# Patient Record
Sex: FEMALE | Race: OTHER | Employment: UNEMPLOYED | URBAN - METROPOLITAN AREA
[De-identification: names, ages, dates, MRNs, and addresses within clinical notes are randomized per-mention and may not be internally consistent; named-entity substitution may affect disease eponyms.]

---

## 2020-02-17 ENCOUNTER — HOSPITAL ENCOUNTER (EMERGENCY)
Age: 35
Discharge: HOME | End: 2020-02-17
Attending: STUDENT IN AN ORGANIZED HEALTH CARE EDUCATION/TRAINING PROGRAM
Payer: SELF-PAY

## 2020-02-17 ENCOUNTER — APPOINTMENT (OUTPATIENT)
Dept: ULTRASOUND IMAGING | Age: 35
End: 2020-02-17
Attending: STUDENT IN AN ORGANIZED HEALTH CARE EDUCATION/TRAINING PROGRAM
Payer: SELF-PAY

## 2020-02-17 VITALS
HEIGHT: 63 IN | RESPIRATION RATE: 14 BRPM | HEART RATE: 70 BPM | WEIGHT: 150 LBS | OXYGEN SATURATION: 99 % | TEMPERATURE: 98.4 F | SYSTOLIC BLOOD PRESSURE: 103 MMHG | DIASTOLIC BLOOD PRESSURE: 66 MMHG | BODY MASS INDEX: 26.58 KG/M2

## 2020-02-17 DIAGNOSIS — N73.0 PID (ACUTE PELVIC INFLAMMATORY DISEASE): Primary | ICD-10-CM

## 2020-02-17 LAB
ALBUMIN SERPL-MCNC: 3.8 G/DL (ref 3.5–5)
ALBUMIN/GLOB SERPL: 1 {RATIO} (ref 1.1–2.2)
ALP SERPL-CCNC: 47 U/L (ref 45–117)
ALT SERPL-CCNC: 19 U/L (ref 12–78)
ANION GAP SERPL CALC-SCNC: 7 MMOL/L (ref 5–15)
APPEARANCE UR: CLEAR
AST SERPL-CCNC: 12 U/L (ref 15–37)
BASOPHILS # BLD: 0 K/UL (ref 0–0.1)
BASOPHILS NFR BLD: 0 % (ref 0–1)
BILIRUB SERPL-MCNC: 0.3 MG/DL (ref 0.2–1)
BILIRUB UR QL: NEGATIVE
BUN SERPL-MCNC: 8 MG/DL (ref 6–20)
BUN/CREAT SERPL: 12 (ref 12–20)
CALCIUM SERPL-MCNC: 8.4 MG/DL (ref 8.5–10.1)
CHLORIDE SERPL-SCNC: 107 MMOL/L (ref 97–108)
CLUE CELLS VAG QL WET PREP: NEGATIVE
CO2 SERPL-SCNC: 28 MMOL/L (ref 21–32)
COLOR UR: NORMAL
CREAT SERPL-MCNC: 0.66 MG/DL (ref 0.55–1.02)
DIFFERENTIAL METHOD BLD: NORMAL
EOSINOPHIL # BLD: 0.1 K/UL (ref 0–0.4)
EOSINOPHIL NFR BLD: 1 % (ref 0–7)
ERYTHROCYTE [DISTWIDTH] IN BLOOD BY AUTOMATED COUNT: 12.6 % (ref 11.5–14.5)
GLOBULIN SER CALC-MCNC: 4 G/DL (ref 2–4)
GLUCOSE SERPL-MCNC: 102 MG/DL (ref 65–100)
GLUCOSE UR STRIP.AUTO-MCNC: NEGATIVE MG/DL
HCG UR QL: NEGATIVE
HCT VFR BLD AUTO: 41.5 % (ref 35–47)
HGB BLD-MCNC: 13.4 G/DL (ref 11.5–16)
HGB UR QL STRIP: NEGATIVE
KETONES UR QL STRIP.AUTO: NEGATIVE MG/DL
KOH PREP SPEC: NORMAL
LEUKOCYTE ESTERASE UR QL STRIP.AUTO: NEGATIVE
LIPASE SERPL-CCNC: 129 U/L (ref 73–393)
LYMPHOCYTES # BLD: 1.2 K/UL (ref 0.8–3.5)
LYMPHOCYTES NFR BLD: 25 % (ref 12–49)
MCH RBC QN AUTO: 30.7 PG (ref 26–34)
MCHC RBC AUTO-ENTMCNC: 32.3 G/DL (ref 30–36.5)
MCV RBC AUTO: 95.2 FL (ref 80–99)
MONOCYTES # BLD: 0.3 K/UL (ref 0–1)
MONOCYTES NFR BLD: 6 % (ref 5–13)
NEUTS SEG # BLD: 3.3 K/UL (ref 1.8–8)
NEUTS SEG NFR BLD: 68 % (ref 32–75)
NITRITE UR QL STRIP.AUTO: NEGATIVE
PH UR STRIP: 6.5 [PH] (ref 5–8)
PLATELET # BLD AUTO: 258 K/UL (ref 150–400)
PMV BLD AUTO: 10 FL (ref 8.9–12.9)
POTASSIUM SERPL-SCNC: 3.7 MMOL/L (ref 3.5–5.1)
PROT SERPL-MCNC: 7.8 G/DL (ref 6.4–8.2)
PROT UR STRIP-MCNC: NEGATIVE MG/DL
RBC # BLD AUTO: 4.36 M/UL (ref 3.8–5.2)
SERVICE CMNT-IMP: NORMAL
SODIUM SERPL-SCNC: 142 MMOL/L (ref 136–145)
SP GR UR REFRACTOMETRY: 1.01 (ref 1–1.03)
T VAGINALIS VAG QL WET PREP: NORMAL
UROBILINOGEN UR QL STRIP.AUTO: 0.2 EU/DL (ref 0.2–1)
WBC # BLD AUTO: 4.9 K/UL (ref 3.6–11)

## 2020-02-17 PROCEDURE — 76830 TRANSVAGINAL US NON-OB: CPT

## 2020-02-17 PROCEDURE — 74011250636 HC RX REV CODE- 250/636: Performed by: STUDENT IN AN ORGANIZED HEALTH CARE EDUCATION/TRAINING PROGRAM

## 2020-02-17 PROCEDURE — 99284 EMERGENCY DEPT VISIT MOD MDM: CPT

## 2020-02-17 PROCEDURE — 83690 ASSAY OF LIPASE: CPT

## 2020-02-17 PROCEDURE — 81003 URINALYSIS AUTO W/O SCOPE: CPT

## 2020-02-17 PROCEDURE — 81025 URINE PREGNANCY TEST: CPT

## 2020-02-17 PROCEDURE — 74011250637 HC RX REV CODE- 250/637: Performed by: STUDENT IN AN ORGANIZED HEALTH CARE EDUCATION/TRAINING PROGRAM

## 2020-02-17 PROCEDURE — 85025 COMPLETE CBC W/AUTO DIFF WBC: CPT

## 2020-02-17 PROCEDURE — 87491 CHLMYD TRACH DNA AMP PROBE: CPT

## 2020-02-17 PROCEDURE — 87210 SMEAR WET MOUNT SALINE/INK: CPT

## 2020-02-17 PROCEDURE — 36415 COLL VENOUS BLD VENIPUNCTURE: CPT

## 2020-02-17 PROCEDURE — 76856 US EXAM PELVIC COMPLETE: CPT

## 2020-02-17 PROCEDURE — 80053 COMPREHEN METABOLIC PANEL: CPT

## 2020-02-17 PROCEDURE — 76705 ECHO EXAM OF ABDOMEN: CPT

## 2020-02-17 PROCEDURE — 96372 THER/PROPH/DIAG INJ SC/IM: CPT

## 2020-02-17 PROCEDURE — 74011000250 HC RX REV CODE- 250: Performed by: STUDENT IN AN ORGANIZED HEALTH CARE EDUCATION/TRAINING PROGRAM

## 2020-02-17 RX ORDER — DOXYCYCLINE HYCLATE 100 MG
100 TABLET ORAL 2 TIMES DAILY
Qty: 28 TAB | Refills: 0 | Status: SHIPPED | OUTPATIENT
Start: 2020-02-17 | End: 2020-03-02

## 2020-02-17 RX ORDER — NAPROXEN 500 MG/1
500 TABLET ORAL 2 TIMES DAILY WITH MEALS
Qty: 20 TAB | Refills: 0 | Status: SHIPPED | OUTPATIENT
Start: 2020-02-17 | End: 2020-02-27

## 2020-02-17 RX ORDER — AZITHROMYCIN 250 MG/1
1000 TABLET, FILM COATED ORAL
Status: COMPLETED | OUTPATIENT
Start: 2020-02-17 | End: 2020-02-17

## 2020-02-17 RX ADMIN — AZITHROMYCIN MONOHYDRATE 1000 MG: 250 TABLET ORAL at 11:44

## 2020-02-17 RX ADMIN — LIDOCAINE HYDROCHLORIDE 40 ML: 20 SOLUTION ORAL; TOPICAL at 10:03

## 2020-02-17 RX ADMIN — LIDOCAINE HYDROCHLORIDE 250 MG: 10 INJECTION, SOLUTION EPIDURAL; INFILTRATION; INTRACAUDAL; PERINEURAL at 11:45

## 2020-02-17 NOTE — ED NOTES
The patient was discharged home by provider in stable condition. The patient is alert and oriented, in no respiratory distress and discharge vital signs obtained. The patient's diagnosis, condition and treatment were explained. The patient expressed understanding. Two prescriptions given. A discharge plan has been developed. A  was not involved in the process. Aftercare instructions were given. Patient ambulatory out of the ED with family.

## 2020-02-17 NOTE — DISCHARGE INSTRUCTIONS
PLEASE RETURN IF ABDOMINAL PAIN WORSENS, LOCALIZES TO THE RIGHT LOWER ABDOMEN, FEVER, OR IF YOU ARE NOT ABLE TO KEEP DOWN LIQUIDS. POR FAVOR TOME LOS MEDICAMENTOS SEGÚN  [facility].  [address] CON OB / Onalee Maik EN LOS PRÓXIMOS 2-3 DÍAS. SI NO ESTÁ MEJORANDO, PUEDEN NECESITAR QUITAR AMOS DIU. SIN SEXO POR 2 SEMANAS.

## 2020-02-17 NOTE — ED PROVIDER NOTES
The patient is a 68-year-old female presenting to the emergency department today secondary to abdominal pain. History obtained with the use of phone interpretation service. Patient reports 3 days of epigastric burning sensation [place] that radiates to her bilateral flanks. Pain is constant. Nothing makes it better or worse. She has had nausea but no vomiting or diarrhea. No urinary symptoms, vaginal bleeding or discharge. Last menstrual period was about 3 weeks ago, she has an IUD in place. She denies chest pain or shortness of breath. No fevers but has had cold sweats. Denies history of surgery to the abdomen. Denies any other complaints at this time. History reviewed. No pertinent past medical history. History reviewed. No pertinent surgical history. History reviewed. No pertinent family history.     Social History     Socioeconomic History    Marital status: Not on file     Spouse name: Not on file    Number of children: Not on file    Years of education: Not on file    Highest education level: Not on file   Occupational History    Not on file   Social Needs    Financial resource strain: Not on file    Food insecurity:     Worry: Not on file     Inability: Not on file    Transportation needs:     Medical: Not on file     Non-medical: Not on file   Tobacco Use    Smoking status: Never Smoker    Smokeless tobacco: Never Used   Substance and Sexual Activity    Alcohol use: Never     Frequency: Never    Drug use: Not on file    Sexual activity: Not on file   Lifestyle    Physical activity:     Days per week: Not on file     Minutes per session: Not on file    Stress: Not on file   Relationships    Social connections:     Talks on phone: Not on file     Gets together: Not on file     Attends Latter day service: Not on file     Active member of club or organization: Not on file     Attends meetings of clubs or organizations: Not on file     Relationship status: Not on file  Intimate partner violence:     Fear of current or ex partner: Not on file     Emotionally abused: Not on file     Physically abused: Not on file     Forced sexual activity: Not on file   Other Topics Concern    Not on file   Social History Narrative    Not on file         ALLERGIES: Patient has no known allergies. Review of Systems   Constitutional: Positive for chills. Negative for fever. HENT: Negative for congestion and rhinorrhea. Eyes: Negative for redness and visual disturbance. Respiratory: Negative for cough and shortness of breath. Cardiovascular: Negative for chest pain and leg swelling. Gastrointestinal: Positive for abdominal pain and nausea. Negative for diarrhea and vomiting. Genitourinary: Negative for dysuria, flank pain, frequency, hematuria and urgency. Musculoskeletal: Positive for back pain. Negative for arthralgias, myalgias and neck pain. Skin: Negative for rash and wound. Allergic/Immunologic: Negative for immunocompromised state. Neurological: Negative for dizziness and headaches. Vitals:    02/17/20 0919   BP: 103/66   Pulse: 70   Resp: 14   Temp: 98.4 °F (36.9 °C)   SpO2: 99%   Weight: 68 kg (150 lb)   Height: 5' 3\" (1.6 m)            Physical Exam  Vitals signs and nursing note reviewed. Constitutional:       General: She is not in acute distress. Appearance: She is well-developed. She is not diaphoretic. HENT:      Head: Normocephalic. Mouth/Throat:      Pharynx: No oropharyngeal exudate. Eyes:      General:         Right eye: No discharge. Left eye: No discharge. Pupils: Pupils are equal, round, and reactive to light. Neck:      Musculoskeletal: Normal range of motion and neck supple. Cardiovascular:      Rate and Rhythm: Normal rate and regular rhythm. Heart sounds: Normal heart sounds. No murmur. No friction rub. No gallop. Pulmonary:      Effort: Pulmonary effort is normal. No respiratory distress. Breath sounds: Normal breath sounds. No stridor. No wheezing or rales. Abdominal:      General: Bowel sounds are normal. There is no distension. Palpations: Abdomen is soft. Tenderness: There is abdominal tenderness in the right upper quadrant, epigastric area and left upper quadrant. There is no guarding or rebound. Genitourinary:     Comments: Pelvic exam performed with RN Jolynn Yap at bedside  External genitalia normal  +CMT but normal appearing  Thick white discharge  L adnexal tenderness but no fullness  Musculoskeletal: Normal range of motion. General: No deformity. Skin:     General: Skin is warm and dry. Capillary Refill: Capillary refill takes less than 2 seconds. Findings: No rash. Neurological:      Mental Status: She is alert and oriented to person, place, and time. Psychiatric:         Behavior: Behavior normal.        Labs Reviewed:   No leukocytosis or anemia  Normal renal function with electrolytes  Pregnancy negative  Lipase normal  UA not consistent with UTI        Imaging Reviewed:   Right upper quadrant ultrasound negative for gallbladder disease      Course: The patient was reevaluated once again with  phone service. She is now telling me that her pain was actually not in the epigastrium however more so in her \"ovaries\". She is also now tells me that she has brown/bloody pelvic discharge which she explicitly denied earlier. She tells me that her IUD was placed 10 years ago and she gets an annual check for it. I will get a pelvic ultrasound and perform pelvic exam with RN at bedside. Pelvic performed--findings concerning for possible cervicitis. Patient is sexually active with 1 partner. Advised pt to remain abstinent for 1 week and to follow up with gyn. Will treat with Azithro/ceftriaxone now, cultures sent.      Pelvic ultrasound concerning for hydrosalpinx on the left, will treat as PID    I discussed with Dr. Skylar Magana who is the [facility] hospitalist.  There is no longer recommended to pull the IUD with evidence of PID. She thinks that the risk of the patient getting pregnant would be more complicating than leaving the IUD in place. Recommend close follow-up. I had a very lengthy discussion using the  phone with the patient as well as her mother (permission given to discuss in front of her mother). I discussed all this was likely PID which could be caused by sexually transmitted disease. She was advised to remain abstinent from sex for the next 2 weeks. We will call her if cultures are positive. She is to take doxycycline 2 times a day for the next 14 days and advised that this could cause some irritation. Advised her to follow-up with OB or family practice within the next 2 days. I urged the importance of this. I also gave her very strict return precautions to come back here if worsening symptoms. MDM:  19-year-old female with an IUD x10 years presenting to the emergency department today with abdominal pain. Initial exam she complained of upper pain however on reexamination she complained of pelvic pain. Pelvic exam performed and there was discharge which was cultured. Left adnexal tenderness and hydrosalpinx on ultrasound concerning for PID. Treated with ceftriaxone and azithromycin here while awaiting those results and then once these resulted I changed her to oral doxycycline x14 days. IUD was left in place for now but she is to follow-up with GYN/family practice in the next 2 days for reevaluation. She was given very strict return precautions including abdominal pain worsening, fever or any other concerning symptoms. She was discharged home. Clinical Impression:     ICD-10-CM ICD-9-CM    1.  PID (acute pelvic inflammatory disease) N73.0 614.3            Disposition: COREY Dior DO

## 2020-02-17 NOTE — ED NOTES
Pt now reports she had a recent IUD placed. Dr. Yareli Guardado at bedside for evaluation. [employer] utilized for interpretation.

## 2020-02-17 NOTE — ED TRIAGE NOTES
Pt arrived via EMS due to \"cold sweats\", abdominal pain, nausea, and lower back pain. Reports she missed her menstrual cycle this month. Last menstrual cycle January 28th.

## 2020-02-17 NOTE — ED NOTES
Patient ambulated to bathroom steady gait and voided urine without difficulty. Patient now resting on stretcher in no acute distress, family at bedside, call bell in reach, and will continue to monitor.

## 2020-02-19 LAB
C TRACH DNA SPEC QL NAA+PROBE: NEGATIVE
N GONORRHOEA DNA SPEC QL NAA+PROBE: NEGATIVE
SAMPLE TYPE: NORMAL
SERVICE CMNT-IMP: NORMAL
SPECIMEN SOURCE: NORMAL

## 2022-08-15 ENCOUNTER — TELEPHONE (OUTPATIENT)
Dept: FAMILY MEDICINE CLINIC | Age: 37
End: 2022-08-15

## 2022-09-08 ENCOUNTER — HOSPITAL ENCOUNTER (OUTPATIENT)
Dept: LAB | Age: 37
Discharge: HOME | End: 2022-09-08

## 2022-09-08 ENCOUNTER — OFFICE VISIT (OUTPATIENT)
Dept: FAMILY MEDICINE CLINIC | Age: 37
End: 2022-09-08

## 2022-09-08 VITALS
SYSTOLIC BLOOD PRESSURE: 119 MMHG | HEIGHT: 60 IN | OXYGEN SATURATION: 100 % | HEART RATE: 73 BPM | BODY MASS INDEX: 25.95 KG/M2 | DIASTOLIC BLOOD PRESSURE: 74 MMHG | TEMPERATURE: 97.9 F | WEIGHT: 132.2 LBS

## 2022-09-08 DIAGNOSIS — R42 DIZZINESS: ICD-10-CM

## 2022-09-08 DIAGNOSIS — Z72.51 UNPROTECTED SEX: ICD-10-CM

## 2022-09-08 DIAGNOSIS — K21.9 GASTROESOPHAGEAL REFLUX DISEASE, UNSPECIFIED WHETHER ESOPHAGITIS PRESENT: Primary | ICD-10-CM

## 2022-09-08 LAB
HCG URINE, QL. (POC): NEGATIVE
VALID INTERNAL CONTROL?: YES

## 2022-09-08 PROCEDURE — 99203 OFFICE O/P NEW LOW 30 MIN: CPT | Performed by: FAMILY MEDICINE

## 2022-09-08 PROCEDURE — 81025 URINE PREGNANCY TEST: CPT | Performed by: FAMILY MEDICINE

## 2022-09-08 RX ORDER — PANTOPRAZOLE SODIUM 40 MG/1
40 TABLET, DELAYED RELEASE ORAL DAILY
Qty: 30 TABLET | Refills: 2 | Status: SHIPPED | OUTPATIENT
Start: 2022-09-08

## 2022-09-08 NOTE — PROGRESS NOTES
Results for orders placed or performed in visit on 09/08/22   AMB POC URINE PREGNANCY TEST, VISUAL COLOR COMPARISON   Result Value Ref Range    VALID INTERNAL CONTROL POC Yes     HCG urine, Ql. (POC) Negative Negative

## 2022-09-08 NOTE — PROGRESS NOTES
HISTORY OF PRESENT ILLNESS  Melania Long is a 31 y.o. female. HPI  Patient states She suspect she may be pregnant. She feels dizziness, and fatigue and nervous. She has a IUD that was placed 3/11/22. She states 7/4 she had unprotected sex and this is why she is concerned. Her last menstrual cycle was 6/28, and is usually irregular. Needs to be schedule for pap smear,  has not had a pap smear in a long time. She has had a right breast lump for the past 12 years, unchanged. She states she is not eating too much. She likes to eat spicy foods, but this gives her heartburn    Review of Systems   Constitutional:  Negative for chills, fever and weight loss. Respiratory:  Negative for cough, hemoptysis and sputum production. Cardiovascular:  Negative for chest pain, palpitations and orthopnea. Gastrointestinal:  Negative for heartburn, nausea and vomiting. Genitourinary:  Negative for dysuria, frequency and urgency. Musculoskeletal:  Negative for back pain, myalgias and neck pain. Skin:  Negative for itching and rash. Neurological:  Negative for dizziness, tingling and headaches. /74 (BP 1 Location: Right arm, BP Patient Position: Sitting, BP Cuff Size: Adult)   Pulse 73   Temp 97.9 °F (36.6 °C) (Temporal)   Ht 4' 11.84\" (1.52 m)   Wt 132 lb 3.2 oz (60 kg)   LMP  (LMP Unknown)   SpO2 100%   BMI 25.95 kg/m²   Physical Exam  Constitutional:       General: She is not in acute distress. HENT:      Head: Normocephalic. Right Ear: Tympanic membrane normal.      Left Ear: Tympanic membrane normal.   Cardiovascular:      Rate and Rhythm: Normal rate and regular rhythm. Pulses: Normal pulses. Heart sounds: Normal heart sounds. No murmur heard. Pulmonary:      Effort: Pulmonary effort is normal. No respiratory distress. Breath sounds: Normal breath sounds. No wheezing or rhonchi. Abdominal:      General: Bowel sounds are normal. There is no distension. Palpations: Abdomen is soft. Tenderness: There is no abdominal tenderness. Hernia: No hernia is present. Musculoskeletal:         General: No swelling, tenderness or deformity. Normal range of motion. Cervical back: Normal range of motion. No rigidity. Skin:     General: Skin is warm. Findings: No bruising or erythema. Neurological:      Mental Status: She is alert. ASSESSMENT and PLAN  Diagnoses and all orders for this visit:    1. Gastroesophageal reflux disease, unspecified whether esophagitis present  -     pantoprazole (PROTONIX) 40 mg tablet; Take 1 Tablet by mouth daily. 2. Unprotected sex  -     AMB POC URINE PREGNANCY TEST, VISUAL COLOR COMPARISON  -     HIV 1/2 AG/AB, 4TH GENERATION,W RFLX CONFIRM; Future  -     CHLAMYDIA/GC PCR; Future    3. Dizziness  -     CBC WITH AUTOMATED DIFF; Future  -     METABOLIC PANEL, COMPREHENSIVE; Future  -     LIPID PANEL; Future  -     AMYLASE; Future  -     TSH 3RD GENERATION; Future  -     VITAMIN D, 25 HYDROXY; Future      31year old female concerned with unprotected sex and possibility of being pregnant,  UPT at home negative as well as in clinic.   She has had some dizziness and reflux  Check labs today  Start protonix

## 2022-09-08 NOTE — PROGRESS NOTES
ALYCIA printed and with the assistance of [place] interpretor Delio Crockett, reviewed with patient. Patient advised to schedule appointment at the  with [address] [place] [address] in the Pap clinic. Patient also given [place] to use at [facility] to get discount on her Protonix that was ordered today. Patient indicated understanding and appreciated the service today.

## 2022-09-09 LAB
25(OH)D3 SERPL-MCNC: 22.8 NG/ML (ref 30–100)
ALBUMIN SERPL-MCNC: 3.8 G/DL (ref 3.5–5)
ALBUMIN/GLOB SERPL: 1.1 {RATIO} (ref 1.1–2.2)
ALP SERPL-CCNC: 59 U/L (ref 45–117)
ALT SERPL-CCNC: 20 U/L (ref 12–78)
AMYLASE SERPL-CCNC: 28 U/L (ref 25–115)
ANION GAP SERPL CALC-SCNC: 6 MMOL/L (ref 5–15)
AST SERPL-CCNC: 12 U/L (ref 15–37)
BASOPHILS # BLD: 0 K/UL (ref 0–0.1)
BASOPHILS NFR BLD: 1 % (ref 0–1)
BILIRUB SERPL-MCNC: 0.5 MG/DL (ref 0.2–1)
BUN SERPL-MCNC: 10 MG/DL (ref 6–20)
BUN/CREAT SERPL: 17 (ref 12–20)
CALCIUM SERPL-MCNC: 8.7 MG/DL (ref 8.5–10.1)
CHLORIDE SERPL-SCNC: 107 MMOL/L (ref 97–108)
CHOLEST SERPL-MCNC: 162 MG/DL
CO2 SERPL-SCNC: 26 MMOL/L (ref 21–32)
COMMENT, HOLDF: NORMAL
CREAT SERPL-MCNC: 0.58 MG/DL (ref 0.55–1.02)
DIFFERENTIAL METHOD BLD: NORMAL
EOSINOPHIL # BLD: 0.1 K/UL (ref 0–0.4)
EOSINOPHIL NFR BLD: 1 % (ref 0–7)
ERYTHROCYTE [DISTWIDTH] IN BLOOD BY AUTOMATED COUNT: 11.8 % (ref 11.5–14.5)
GLOBULIN SER CALC-MCNC: 3.4 G/DL (ref 2–4)
GLUCOSE SERPL-MCNC: 87 MG/DL (ref 65–100)
HCT VFR BLD AUTO: 39.8 % (ref 35–47)
HDLC SERPL-MCNC: 48 MG/DL
HDLC SERPL: 3.4 {RATIO} (ref 0–5)
HGB BLD-MCNC: 13.4 G/DL (ref 11.5–16)
IMM GRANULOCYTES # BLD AUTO: 0 K/UL (ref 0–0.04)
IMM GRANULOCYTES NFR BLD AUTO: 0 % (ref 0–0.5)
LDLC SERPL CALC-MCNC: 103.6 MG/DL (ref 0–100)
LYMPHOCYTES # BLD: 1.6 K/UL (ref 0.8–3.5)
LYMPHOCYTES NFR BLD: 26 % (ref 12–49)
MCH RBC QN AUTO: 32.1 PG (ref 26–34)
MCHC RBC AUTO-ENTMCNC: 33.7 G/DL (ref 30–36.5)
MCV RBC AUTO: 95.4 FL (ref 80–99)
MONOCYTES # BLD: 0.4 K/UL (ref 0–1)
MONOCYTES NFR BLD: 6 % (ref 5–13)
NEUTS SEG # BLD: 4.1 K/UL (ref 1.8–8)
NEUTS SEG NFR BLD: 66 % (ref 32–75)
NRBC # BLD: 0 K/UL (ref 0–0.01)
NRBC BLD-RTO: 0 PER 100 WBC
PLATELET # BLD AUTO: 268 K/UL (ref 150–400)
PMV BLD AUTO: 11 FL (ref 8.9–12.9)
POTASSIUM SERPL-SCNC: 3.7 MMOL/L (ref 3.5–5.1)
PROT SERPL-MCNC: 7.2 G/DL (ref 6.4–8.2)
RBC # BLD AUTO: 4.17 M/UL (ref 3.8–5.2)
SAMPLES BEING HELD,HOLD: NORMAL
SODIUM SERPL-SCNC: 139 MMOL/L (ref 136–145)
TRIGL SERPL-MCNC: 52 MG/DL (ref ?–150)
TSH SERPL DL<=0.05 MIU/L-ACNC: 1.09 UIU/ML (ref 0.36–3.74)
VLDLC SERPL CALC-MCNC: 10.4 MG/DL
WBC # BLD AUTO: 6.2 K/UL (ref 3.6–11)

## 2022-09-09 PROCEDURE — 85025 COMPLETE CBC W/AUTO DIFF WBC: CPT

## 2022-09-09 PROCEDURE — 80061 LIPID PANEL: CPT

## 2022-09-09 PROCEDURE — 84443 ASSAY THYROID STIM HORMONE: CPT

## 2022-09-09 PROCEDURE — 82150 ASSAY OF AMYLASE: CPT

## 2022-09-09 PROCEDURE — 87491 CHLMYD TRACH DNA AMP PROBE: CPT

## 2022-09-09 PROCEDURE — 82306 VITAMIN D 25 HYDROXY: CPT

## 2022-09-09 PROCEDURE — 80053 COMPREHEN METABOLIC PANEL: CPT

## 2022-09-12 NOTE — PROGRESS NOTES
Normal labs except mildly decreased vitamin D,  advise patient to take OTC vitamin D 2000 international units a day  Patient can be informed

## 2022-09-13 LAB
C TRACH RRNA SPEC QL NAA+PROBE: NEGATIVE
N GONORRHOEA RRNA SPEC QL NAA+PROBE: NEGATIVE
SPECIMEN SOURCE: NORMAL

## 2022-09-20 ENCOUNTER — OFFICE VISIT (OUTPATIENT)
Dept: FAMILY MEDICINE CLINIC | Age: 37
End: 2022-09-20

## 2022-09-20 ENCOUNTER — HOSPITAL ENCOUNTER (OUTPATIENT)
Dept: LAB | Age: 37
Discharge: HOME | End: 2022-09-20

## 2022-09-20 VITALS
OXYGEN SATURATION: 100 % | WEIGHT: 132 LBS | TEMPERATURE: 97.3 F | BODY MASS INDEX: 26.61 KG/M2 | SYSTOLIC BLOOD PRESSURE: 109 MMHG | HEART RATE: 65 BPM | HEIGHT: 59 IN | DIASTOLIC BLOOD PRESSURE: 67 MMHG

## 2022-09-20 DIAGNOSIS — Z97.5 IUD (INTRAUTERINE DEVICE) IN PLACE: ICD-10-CM

## 2022-09-20 DIAGNOSIS — N92.6 MISSED PERIOD: ICD-10-CM

## 2022-09-20 DIAGNOSIS — N63.0 BREAST MASS IN FEMALE: ICD-10-CM

## 2022-09-20 DIAGNOSIS — Z01.419 ENCOUNTER FOR WELL WOMAN EXAM: Primary | ICD-10-CM

## 2022-09-20 LAB
HCG URINE, QL. (POC): NEGATIVE
VALID INTERNAL CONTROL?: YES

## 2022-09-20 PROCEDURE — 88175 CYTOPATH C/V AUTO FLUID REDO: CPT

## 2022-09-20 PROCEDURE — 99213 OFFICE O/P EST LOW 20 MIN: CPT | Performed by: PHYSICIAN ASSISTANT

## 2022-09-20 PROCEDURE — 87624 HPV HI-RISK TYP POOLED RSLT: CPT

## 2022-09-20 PROCEDURE — 81025 URINE PREGNANCY TEST: CPT | Performed by: PHYSICIAN ASSISTANT

## 2022-09-20 NOTE — PROGRESS NOTES
AVS printed and with the assistance of [place] interpretor, Everardo Mckeon, reviewed with patient. Pelvic ultrasound scheduled for Sunday, Sept 25th at 9:00am with an 8:30am arrival time at [place] [address]. Patient was instructed to drink 32-48 ounces of water, tea or other clear liquid one (1) hour prior to test.  Patient was advised to enter through the [place] [address] Emergency Department. Patient was also instructed to contact [facility] to schedule appointment to have left breast mass checked out. Patient indicated understanding and appreciated the service today.

## 2022-09-20 NOTE — PROGRESS NOTES
Assessment/Plan:    Diagnoses and all orders for this visit:    1. Encounter for well woman exam  -     PAP IG, APTIMA HPV AND RFX 16/18,45 ([id number]); Future    2. Missed period, multiple negative pregnancy tests. 3. IUD (intrauterine device) in place      4. Breast mass in female  Send to [facility] left breast mass 5-7 oclock just inferior to nipple 2 by 3 cm mobile, firm   Right breast 10-12 rubbery less distinct borders, mobile     Urine pregnancy test neg, get pelvic US to confirm location of IUD and for reassurance for pt that she is not pregnant  Pseudocyesis? Follow-up and Dispositions             LISSETT Wright expressed understanding of this plan. An AVS was printed and given to the patient.      ----------------------------------------------------------------------    Chief Complaint   Patient presents with    Well Woman     PAP exam    Other     See depression screening       History of Present Illness:  Pt presents for well woman exam   17 yo   Now feels like she is pregnant for a few months. Many neg preg tests  Has IUD copper T, thinks that her IUD was placed 6 months ago  In a safe monogamous relationship, no risk of DV  Is not sure when her last pap was done  Has breast masses that she is concerned about for a few months. Past Medical History:   Diagnosis Date    Autism disorder     Narcolepsy        Current Outpatient Medications   Medication Sig Dispense Refill    pantoprazole (PROTONIX) 40 mg tablet Take 1 Tablet by mouth daily.  (Patient not taking: Reported on 2022) 30 Tablet 2       Allergies   Allergen Reactions    Adhesive Itching    Avocado Itching    Pineapple Itching       Social History     Tobacco Use    Smoking status: Never    Smokeless tobacco: Never   Vaping Use    Vaping Use: Never used   Substance Use Topics    Alcohol use: Not Currently    Drug use: Not Currently       No family history on file.    Physical Exam:     Visit Vitals  /67 (BP 1 Location: Left upper arm, BP Patient Position: Sitting)   Pulse 65   Temp 97.3 °F (36.3 °C) (Temporal)   Ht 4' 10.78\" (1.493 m)   Wt 132 lb (59.9 kg)   LMP 06/28/2022   SpO2 100%   BMI 26.86 kg/m²       A&Ox3  WDWN NAD  Respirations normal and non labored    Breast exam- left breast mass 4-6 oclock 2 by 3 cm firm mobile  Right breast 10-12 oclock rubbery mass  Pelvic exam- ext neg for lesion or discharge. Cervix and vagina with very short IUD string intact. Uterus fundus not elevated but uterus feels soft ?  Lax abdominal muscles

## 2022-09-20 NOTE — PROGRESS NOTES
[facility]    When was your last pap smear and where? Patient can not recall last PAP exam     Any abnormal results from previous pap smears? Normal    Any problems with your breasts? 2 lumps on right breast and axila    Any family history of breast/ovarian cancer? yes    Do you have any kids? 1      Oldest 13 years. youngest 0    Are you sexually active? yes    Are you using any type of birth control? If yes where do you go for this? IUD    Abuse screening completed this visit?  yes      Results for orders placed or performed in visit on 09/20/22   AMB POC URINE PREGNANCY TEST, VISUAL COLOR COMPARISON   Result Value Ref Range    VALID INTERNAL CONTROL POC Yes     HCG urine, Ql. (POC) Negative Negative

## 2022-09-25 ENCOUNTER — HOSPITAL ENCOUNTER (OUTPATIENT)
Dept: ULTRASOUND IMAGING | Age: 37
Discharge: HOME | End: 2022-09-25

## 2022-09-25 DIAGNOSIS — N92.6 MISSED PERIOD: ICD-10-CM

## 2022-09-25 PROCEDURE — 76830 TRANSVAGINAL US NON-OB: CPT

## 2022-09-25 PROCEDURE — 76856 US EXAM PELVIC COMPLETE: CPT

## 2022-09-27 ENCOUNTER — TELEPHONE (OUTPATIENT)
Dept: FAMILY PLANNING/WOMEN'S HEALTH CLINIC | Age: 37
End: 2022-09-27

## 2022-09-27 NOTE — TELEPHONE ENCOUNTER
Patient referred by Nai Salas. Spoke with patient on phone to screen for [facility]. Overview of program given. Eligibility criteria reviewed. Client does meet all qualifications and does want to enter into program. Patient has been scheduled for [facility] appointment.

## 2022-09-29 ENCOUNTER — VIRTUAL VISIT (OUTPATIENT)
Dept: FAMILY MEDICINE CLINIC | Age: 37
End: 2022-09-29

## 2022-09-29 DIAGNOSIS — N93.8 DUB (DYSFUNCTIONAL UTERINE BLEEDING): Primary | ICD-10-CM

## 2022-09-29 PROCEDURE — 99442 PR PHYS/QHP TELEPHONE EVALUATION 11-20 MIN: CPT | Performed by: PHYSICIAN ASSISTANT

## 2022-09-29 NOTE — PROGRESS NOTES
TC for intake.  [id number] assisted with this phone call. Pt reports that she does not have acces to VS equipment at this time. Coordination of Care  1. Have you been to the ER, urgent care clinic since your last visit? Hospitalized since your last visit? No    2. Have you seen or consulted any other health care providers outside of the [address] [place] [address] since your last visit? Include any pap smears or colon screening. No    Does the patient need refills? NO    Learning Assessment Complete?  yes  Depression Screening complete in the past 12 months? yes

## 2022-09-29 NOTE — PROGRESS NOTES
Assessment/Plan:    Diagnoses and all orders for this visit:    1. DUB (dysfunctional uterine bleeding)    Advised pt to call the clinic where she got the IUD to ask them what type of IUD she has- it is likely a mirena which would explain the infrequent periods  She was given the result that she is not pregnant and she seemed okay with this result  She was asked to f/up with me as needed     Follow-up and Dispositions    Return if symptoms worsen or fail to improve. LISSETT Veras expressed understanding of this plan. An AVS was printed and given to the patient.      ----------------------------------------------------------------------    Chief Complaint   Patient presents with    Results     Pt would like to know the results of her recent imaging. History of Present Illness:  Pt called and consented to virtual telephone call, she declined video  She was identified by name and     She is being seen today to f/up on DUB (missed periods) and concern for pregnancy. As per my last note, I was concerned that she was hoping that she was pregnant in spite of neg pregnancy tests and having had an IUD placed in the past 6 months. Her sxs were \"feeling movement in my uterus\"  She was given all of the results and she took the results well. She did not seem disappointed in the results        Past Medical History:   Diagnosis Date    Autism disorder     Narcolepsy        Current Outpatient Medications   Medication Sig Dispense Refill    pantoprazole (PROTONIX) 40 mg tablet Take 1 Tablet by mouth daily.  30 Tablet 2       Allergies   Allergen Reactions    Adhesive Itching    Avocado Itching    Other Plant, Animal, Environmental Itching     Cleaning solution -- pt is unsure which    Pineapple Itching       Social History     Tobacco Use    Smoking status: Never    Smokeless tobacco: Never   Vaping Use    Vaping Use: Never used   Substance Use Topics    Alcohol use: Not Currently Drug use: Not Currently       No family history on file.     Physical Exam:     Visit Vitals  LMP  (LMP Unknown)       A&Ox3  WDWN NAD  Respirations normal and non labored

## 2022-10-13 NOTE — PROGRESS NOTES
Tc to the pt she was given the message regarding the lab results with Jeremy Pain . She was given the entire results. She can take Vit D otc 2000 iu daily.  Ernie Weber RN

## 2022-10-25 ENCOUNTER — TELEPHONE (OUTPATIENT)
Dept: FAMILY PLANNING/WOMEN'S HEALTH CLINIC | Age: 37
End: 2022-10-25

## 2022-10-25 NOTE — TELEPHONE ENCOUNTER
Called patient to remind of upcoming appointment 2x. No answer from the patient, left voicemail on 2nd attempt. Provided the screening line([phone]) in case patient has to reschedule. A text message has been sent providing appointment details.

## 2022-10-31 ENCOUNTER — TRANSCRIBE ORDER (OUTPATIENT)
Dept: SCHEDULING | Age: 37
End: 2022-10-31

## 2022-10-31 DIAGNOSIS — N63.20 LEFT BREAST MASS: Primary | ICD-10-CM

## 2022-11-02 ENCOUNTER — OFFICE VISIT (OUTPATIENT)
Dept: FAMILY PLANNING/WOMEN'S HEALTH CLINIC | Age: 37
End: 2022-11-02

## 2022-11-02 ENCOUNTER — HOSPITAL ENCOUNTER (OUTPATIENT)
Dept: MAMMOGRAPHY | Age: 37
Discharge: HOME | End: 2022-11-02

## 2022-11-02 ENCOUNTER — TRANSCRIBE ORDER (OUTPATIENT)
Dept: MAMMOGRAPHY | Age: 37
End: 2022-11-02

## 2022-11-02 DIAGNOSIS — R92.8 ABNORMAL SCREENING MAMMOGRAM: Primary | ICD-10-CM

## 2022-11-02 DIAGNOSIS — R92.8 ABNORMAL SCREENING MAMMOGRAM: ICD-10-CM

## 2022-11-02 DIAGNOSIS — R92.8 ABNORMAL MAMMOGRAM OF RIGHT BREAST: ICD-10-CM

## 2022-11-02 DIAGNOSIS — R92.8 ABNORMAL MAMMOGRAM OF RIGHT BREAST: Primary | ICD-10-CM

## 2022-11-02 DIAGNOSIS — N63.0 BREAST MASS IN FEMALE: Primary | ICD-10-CM

## 2022-11-02 PROCEDURE — 76642 ULTRASOUND BREAST LIMITED: CPT

## 2022-11-02 PROCEDURE — 77062 BREAST TOMOSYNTHESIS BI: CPT

## 2022-11-02 NOTE — PROGRESS NOTES
Assessment/Plan:    Diagnoses and all orders for this visit:    1. Breast mass in female      Mass on right breast only today, the previously felt left breast mass is no longer palpable       LISSETT Salter expressed understanding of this plan. An AVS was printed and given to the patient.      ----------------------------------------------------------------------    Chief Complaint   Patient presents with    Breast Problem     José Miguel breast mass found by provider       History of Present Illness:    Please see my note from 9/22 for initial eval of breast mass. She has had a right breast mass for more then 10 years. It has not changed much during this time  She had a left breast mass when I evaluated her 6 weeks ago which is no longer present     Past Medical History:   Diagnosis Date    Autism disorder     Narcolepsy        Current Outpatient Medications   Medication Sig Dispense Refill    pantoprazole (PROTONIX) 40 mg tablet Take 1 Tablet by mouth daily. (Patient not taking: Reported on 11/2/2022) 30 Tablet 2       Allergies   Allergen Reactions    Adhesive Itching    Avocado Itching    Other Plant, Animal, Environmental Itching     Cleaning solution -- pt is unsure which    Pineapple Itching       Social History     Tobacco Use    Smoking status: Some Days     Types: Cigarettes     Start date: 1/1/2004    Smokeless tobacco: Never   Vaping Use    Vaping Use: Never used   Substance Use Topics    Alcohol use: Not Currently    Drug use: Not Currently       No family history on file. Physical Exam:     Visit Vitals  LMP 10/28/2022 (Exact Date)       A&Ox3  WDWN NAD  Respirations normal and non labored  Breast exam- josé miguel neg for dimpling, retractions, skin color changes, nipple changes.  Right breast 9 oclock to 11 oclock there is a 2 by 3 cm firm mobile mass

## 2022-11-02 NOTE — PROGRESS NOTES
EV[employer] HISTORY QUESTIONNAIRE       No Yes Comments   Has a doctor ever seen or felt anything wrong with your breast? []                                  [x]                                  Pt had appt. At [facility] see breast masses noted and referred to us. Have you ever had a breast biopsy? [x]                                  []                                          When and where was last mammogram performed? None, this will be her 1st mammogram     Have you ever been told that there was a problem on your mammogram?   No Yes Comments   []                                  []                                  N/a     Do you have breast implants? No Yes Comments   [x]                                  []                                       When was your last Pap test performed? 9/20/2022 at [facility] NIL/HPV Neg. UTD on cervical exam    Have you ever had an abnormal Pap test?   No Yes Comments   [x]                                  []                                       Have you had a hysterectomy? No Yes Comments (why)   [x]                                  []                                       Have you been through menopause? No Yes Date of LMP   [x]                                  []                                       Did your mother take SOPHIE? No Yes Unknown   [x]                                  []                                       Do you have a history of HIV exposure? No Yes    [x]                                  []                                       Have you ever been diagnosed with any type of Cancer   No Yes Comments (type,when,where,type of treatment   [x]                                  []                                          Has a family member been diagnosed with breast or ovarian cancer?    No Yes Comments (which family members, and type   []                                  [x]                                  Maternal grandmother had ovarian cancer at age 39 Are you taking hormone replacement therapy (HRT)     No Yes Comments   [x]                                  []                                       How many times have you been pregnant? 1        Number of live births ? 1    Are you experiencing any of the following? No Yes Comments   Nipple Discharge [x]                                  []                                     Breast Lump/Masses []                                  [x]                                  Right breast mass   Breast Skin Changes [x]                                  []                                          No Yes Comments   Vaginal Discharge [x]                                  []                                     Abnormal/unusual vaginal bleeding [x]                                  []                                         Are you experiencing any other health problems? None reported- goes to [facility] care      Age at first period? 17  Age at first birth?  27

## 2022-11-07 ENCOUNTER — TRANSCRIBE ORDER (OUTPATIENT)
Dept: MAMMOGRAPHY | Age: 37
End: 2022-11-07

## 2022-11-07 DIAGNOSIS — N63.10 BREAST MASS, RIGHT: Primary | ICD-10-CM

## 2022-11-07 NOTE — PROGRESS NOTES
11/2/2022- After results of Diagnostic breast imaging were resulted, this nurse met with patient and her mother as per the request of patient, all questions answered. A new appt. At [facility] has been created so that patient can return for her right breast ultrasound guided biopsy. Discussed that this upcoming visit should also be covered by [employer]. This has been fully explained to the patient, who indicates understanding and agrees with plan. No further questions at this time.  Carlos Mcintyre RN

## 2022-11-08 ENCOUNTER — DOCUMENTATION ONLY (OUTPATIENT)
Dept: FAMILY PLANNING/WOMEN'S HEALTH CLINIC | Age: 37
End: 2022-11-08

## 2022-11-08 NOTE — PROGRESS NOTES
DONNA (International Breast Cancer Intervation Study) Online guerline-christiano model breast cancer risk evaluation tool resulted in 8.7%.

## 2022-11-09 ENCOUNTER — TELEPHONE (OUTPATIENT)
Dept: FAMILY PLANNING/WOMEN'S HEALTH CLINIC | Age: 37
End: 2022-11-09

## 2022-11-09 NOTE — TELEPHONE ENCOUNTER
The [facility]  number is [id number]. Called pt x 2 but no answer. Calling pt to see why she missed biopsy appt. Left .  Araceli Villela RN

## 2022-11-15 ENCOUNTER — TELEPHONE (OUTPATIENT)
Dept: FAMILY MEDICINE CLINIC | Age: 37
End: 2022-11-15

## 2022-11-15 NOTE — TELEPHONE ENCOUNTER
Patient has a new set date for the breast biopsy at [facility] imaging. Patient agreed with date, time and place of appt. Patient understands the importance of keeping the appt.  Carlos Mcintyre RN

## 2022-11-15 NOTE — TELEPHONE ENCOUNTER
Nurse telephoned and spoke with patient, pt states that she forgot about her biopsy appt. But would like to reschedule. I talked to patient about the importance of keeping appointment. Nurse will coordinate with the mammogram suite to get next available biopsy appt. Pt agrees and will be waiting for returned phone call.

## 2022-11-15 NOTE — TELEPHONE ENCOUNTER
Returned lab letter received in the [facility] office[facility]- not deliverable as addressed -unable to forward. Tc to the pt to ask her address that she receive's mail at. The  # [id number]. The pt stated it is the correct address it is Mailbox [address] that has to be on the mail for her to receive the mail. This was corrected on the Demo page of the chart. The pt was given the results of her labs, and the lab results were put into a new envelope with the B mailbox included in the address to be mailed.  Judy Mcmahon RN

## 2022-11-28 ENCOUNTER — HOSPITAL ENCOUNTER (OUTPATIENT)
Dept: MAMMOGRAPHY | Age: 37
Discharge: HOME | End: 2022-11-28

## 2022-11-28 DIAGNOSIS — N63.10 BREAST MASS, RIGHT: ICD-10-CM

## 2022-11-28 PROCEDURE — 19083 BX BREAST 1ST LESION US IMAG: CPT

## 2022-11-28 PROCEDURE — 77065 DX MAMMO INCL CAD UNI: CPT

## 2022-11-28 PROCEDURE — 74011000250 HC RX REV CODE- 250: Performed by: RADIOLOGY

## 2022-11-28 PROCEDURE — 88305 TISSUE EXAM BY PATHOLOGIST: CPT

## 2022-11-28 RX ORDER — SODIUM BICARBONATE 42 MG/ML
5 INJECTION, SOLUTION INTRAVENOUS
Status: COMPLETED | OUTPATIENT
Start: 2022-11-28 | End: 2022-11-28

## 2022-11-28 RX ORDER — LIDOCAINE HYDROCHLORIDE 10 MG/ML
20 INJECTION INFILTRATION; PERINEURAL
Status: COMPLETED | OUTPATIENT
Start: 2022-11-28 | End: 2022-11-28

## 2022-11-28 RX ADMIN — LIDOCAINE HYDROCHLORIDE 20 ML: 10 INJECTION, SOLUTION INFILTRATION; PERINEURAL at 14:35

## 2022-11-28 RX ADMIN — SODIUM BICARBONATE 210 MG: 42 INJECTION, SOLUTION INTRAVENOUS at 14:35

## 2022-11-28 NOTE — PROGRESS NOTES
Patient received for right breast ultrasound-guided biopsy. Procedure explained to patient as well as risks associated with procedure ( Emre Downey #[id number]). Discharge instructions briefly reviewed with patient. Patient expects to be contacted by phone with pathology results.

## 2022-11-28 NOTE — PROGRESS NOTES
Patient tolerated procedure well with minimal bleeding. Pressure held to site for 5 minutes; dressing applied (steri-strips, gauze, paper tape). Patient then sent for a post biopsy mammogram.  Discharge instructions reviewed with patient (with  Shaji Whitley #[id number]) and a copy (in [address] [place] [address]) given to patient. Dressing dry and intact on discharge; ice pack applied over dressing. Patient expects to be contacted by phone with pathology results.

## 2022-12-01 NOTE — PROGRESS NOTES
Left Tammy, with [facility], message to call back to [facility] AT [facility] regarding patient's biopsy results.